# Patient Record
Sex: MALE | Race: OTHER | URBAN - METROPOLITAN AREA
[De-identification: names, ages, dates, MRNs, and addresses within clinical notes are randomized per-mention and may not be internally consistent; named-entity substitution may affect disease eponyms.]

---

## 2023-01-09 ENCOUNTER — OFFICE VISIT (OUTPATIENT)
Dept: ORTHOPEDIC SURGERY | Age: 48
End: 2023-01-09

## 2023-01-09 VITALS — BODY MASS INDEX: 35.55 KG/M2 | HEIGHT: 69 IN | WEIGHT: 240 LBS

## 2023-01-09 DIAGNOSIS — S83.412A SPRAIN OF MEDIAL COLLATERAL LIGAMENT OF LEFT KNEE, INITIAL ENCOUNTER: ICD-10-CM

## 2023-01-09 DIAGNOSIS — M25.561 RIGHT KNEE PAIN, UNSPECIFIED CHRONICITY: Primary | ICD-10-CM

## 2023-01-09 PROBLEM — I25.10 CORONARY ATHEROSCLEROSIS: Status: ACTIVE | Noted: 2018-10-23

## 2023-01-09 PROBLEM — I51.89 LEFT VENTRICULAR DIASTOLIC DYSFUNCTION WITH PRESERVED SYSTOLIC FUNCTION: Status: ACTIVE | Noted: 2023-01-09

## 2023-01-09 PROBLEM — R20.0 RIGHT LEG NUMBNESS: Status: ACTIVE | Noted: 2018-10-23

## 2023-01-09 PROBLEM — I27.20 PULMONARY HYPERTENSION (HCC): Status: ACTIVE | Noted: 2023-01-09

## 2023-01-09 PROBLEM — E66.9 OBESITY (BMI 30-39.9): Status: ACTIVE | Noted: 2019-10-02

## 2023-01-09 PROBLEM — I10 ESSENTIAL HYPERTENSION: Status: ACTIVE | Noted: 2021-10-12

## 2023-01-09 PROBLEM — I25.2 HISTORY OF MYOCARDIAL INFARCTION: Status: ACTIVE | Noted: 2019-01-23

## 2023-01-09 PROBLEM — G93.2 IDIOPATHIC INTRACRANIAL HYPERTENSION: Status: ACTIVE | Noted: 2022-02-22

## 2023-01-09 PROBLEM — E78.5 HYPERLIPIDEMIA: Status: ACTIVE | Noted: 2021-10-12

## 2023-01-09 PROBLEM — K92.1 MELENA: Status: ACTIVE | Noted: 2018-10-23

## 2023-01-09 PROBLEM — G47.33 OSA (OBSTRUCTIVE SLEEP APNEA): Status: ACTIVE | Noted: 2019-10-16

## 2023-01-09 PROBLEM — R51.9 INTRACTABLE HEADACHE: Status: ACTIVE | Noted: 2018-10-23

## 2023-01-09 PROBLEM — U07.1 COVID-19: Status: ACTIVE | Noted: 2020-12-09

## 2023-01-09 PROBLEM — E11.65 TYPE 2 DIABETES MELLITUS WITH HYPERGLYCEMIA (HCC): Status: ACTIVE | Noted: 2018-10-23

## 2023-01-09 PROBLEM — R10.13 DYSPEPSIA: Status: ACTIVE | Noted: 2018-10-23

## 2023-01-09 RX ORDER — HYDROCODONE BITARTRATE AND ACETAMINOPHEN 5; 325 MG/1; MG/1
TABLET ORAL
COMMUNITY
Start: 2022-12-29

## 2023-01-09 RX ORDER — OSELTAMIVIR PHOSPHATE 75 MG/1
75 CAPSULE ORAL 2 TIMES DAILY
COMMUNITY
Start: 2022-10-28

## 2023-01-09 RX ORDER — LISINOPRIL 10 MG/1
TABLET ORAL
COMMUNITY

## 2023-01-09 RX ORDER — ATORVASTATIN CALCIUM 20 MG/1
TABLET, FILM COATED ORAL
COMMUNITY

## 2023-01-09 RX ORDER — EMPAGLIFLOZIN 25 MG/1
TABLET, FILM COATED ORAL
COMMUNITY
Start: 2023-01-03

## 2023-01-09 RX ORDER — BENZONATATE 100 MG/1
CAPSULE ORAL
COMMUNITY
Start: 2022-10-29

## 2023-01-09 RX ORDER — POLYETHYLENE GLYCOL 3350 17 G/17G
17 POWDER, FOR SOLUTION ORAL DAILY PRN
COMMUNITY
Start: 2022-02-25

## 2023-01-09 RX ORDER — TOPIRAMATE 50 MG/1
TABLET, FILM COATED ORAL
COMMUNITY
Start: 2022-10-21

## 2023-01-09 RX ORDER — NAPROXEN 500 MG/1
500 TABLET ORAL 2 TIMES DAILY WITH MEALS
COMMUNITY
Start: 2022-12-28

## 2023-01-09 RX ORDER — OSELTAMIVIR PHOSPHATE 75 MG/1
CAPSULE ORAL
COMMUNITY
Start: 2022-10-29

## 2023-01-09 RX ORDER — FAMOTIDINE 20 MG/1
TABLET, FILM COATED ORAL
COMMUNITY

## 2023-01-09 RX ORDER — TOPIRAMATE 50 MG/1
TABLET, FILM COATED ORAL
COMMUNITY
Start: 2022-07-14

## 2023-01-09 RX ORDER — GLIPIZIDE 5 MG/1
TABLET, FILM COATED, EXTENDED RELEASE ORAL
COMMUNITY

## 2023-01-09 RX ORDER — HYDROCODONE BITARTRATE AND ACETAMINOPHEN 5; 325 MG/1; MG/1
1 TABLET ORAL EVERY 6 HOURS PRN
COMMUNITY
Start: 2022-12-28

## 2023-01-09 RX ORDER — OMEPRAZOLE 20 MG/1
20 CAPSULE, DELAYED RELEASE ORAL DAILY
COMMUNITY

## 2023-01-09 RX ORDER — NAPROXEN 500 MG/1
TABLET ORAL
COMMUNITY
Start: 2022-12-29

## 2023-01-09 RX ORDER — GLIPIZIDE 10 MG/1
TABLET, FILM COATED, EXTENDED RELEASE ORAL
COMMUNITY

## 2023-01-09 RX ORDER — BENZONATATE 100 MG/1
100 CAPSULE ORAL EVERY 8 HOURS
COMMUNITY
Start: 2022-10-28

## 2023-01-09 RX ORDER — PANTOPRAZOLE SODIUM 20 MG/1
TABLET, DELAYED RELEASE ORAL
COMMUNITY

## 2023-01-09 RX ORDER — ACETAMINOPHEN 500 MG
1000 TABLET ORAL EVERY 6 HOURS PRN
COMMUNITY
Start: 2022-02-24

## 2023-01-09 RX ORDER — IBUPROFEN 200 MG
400 TABLET ORAL EVERY 6 HOURS PRN
COMMUNITY
Start: 2022-02-24

## 2023-01-09 RX ORDER — RIMEGEPANT SULFATE 75 MG/75MG
75 TABLET, ORALLY DISINTEGRATING ORAL
COMMUNITY
Start: 2022-07-14 | End: 2023-07-14

## 2023-01-09 NOTE — PROGRESS NOTES
Reddie Hinge brace for patients right knee. I explained how the hinge on each side of the brace should line up with the patella. The patient was also instructed to tighten the strap distal to the patella first to insure the brace is anchored and prevents slipping, , then the top strap should be tightened. Patient read and signed documenting they understand and agree to [facility]'s current DME return policy.

## 2023-01-09 NOTE — LETTER
[address] [facility]  [address] [place] [address],[facility] [address]  Phone: [phone]  Fax: [phone], [address]        January 9, 2023     Patient: Trevor Billings   YOB: 1975   Date of Visit: 1/9/2023       To Whom It May Concern: It is my medical opinion that Trevor Billings may return to work but should limit the amount of standing, walking long distances, and stairs as much as possible until his next follow-up. If you have any questions or concerns, please don't hesitate to call.     Sincerely,        Dilma Cooper, [address]
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
## 2023-01-09 NOTE — LETTER
DME Patient Authorization Form    Name: Jacinto Rizo  : 1975  MRN: [id number]   Age: 49 y.o. Gender: male  Delivery Address: [facility]     Diagnosis:     ICD-10-CM    1. Right knee pain, unspecified chronicity  M25.561 Ambulatory referral to Physical Therapy      2. Sprain of medial collateral ligament of right knee, initial encounter  S83.412A Ambulatory referral to Physical Therapy           Requested DME:  Reddie Hinged Brace- ($170.00) X 1 - right        Clinical Notes:     **Indicates non-covered items by insurance. Payment expected on date of service. Electronically signed by  Provider: AI Osei__Date: 2023                            [address] Tax ID # [id number]        Durable Medical Equipment and/or Orthotics Patient Consent     I understand that my physician has prescribed this medical supply as part of my treatment plan as a matter of Medical Necessity.  I understand that I have a choice in where I receive my prescribed orthopedic supplies and/or services.  I authorize [facility] to furnish this service/product and to provide my insurance carrier with any information requested in order to process for payment.  I instruct my insurance carrier to pay [facility] directly for these services/products.  I understand that my insurance carrier may deny payment for this supply because it is a non-covered item, deemed not medically necessary or considered experimental.   I understand that any cost not covered by my insurance carrier will be solely my financial responsibility.  I have received the Supplier Standards and have reviewed them.    I have received the prescribed item and have been fully instructed on the proper use of the above services/products.    ______ (Patient Initials) I understand that all DME items are non-returnable after being dispensed. Items still in sealed packaging may be returned up to 14 days after purchasing. [address] will replace items that are defective.    ______ (Patient Initials) I understand that Jasmin Delgado will not file a claim with my insurance carrier for this service/product and I am waiving my right to file a claim on my own for this service/product with my insurance company as this item is NON-COVERED (Denoted by the **) by my Insurance company/policy. ______ (Patient Initials) I understand that I am responsible to bring my equipment to the hospital for any surgery. ______________________________________________  ________________________  Patient / Valdez Mace            Thank you for considering [address] Your physician has prescribed specific medical equipment or devices for your home use. The following describes your rights and responsibilities as our customer. Right to Choose Providers: You have a choice regarding which company supplies your home medical equipment and devices, and to consult your physician in this decision. You may choose a medical supply store, a home medical equipment provider, or a specialist such as [facility]. [facility] will coordinate with your physician to provide the medical equipment or devices prescribed for your home use. Right to Service:  You have the right to considerate, respectful and nondiscriminatory care. You have the right to receive accurate and easily understood information about your health care. If you speak a foreign language, or don't understand the discussions, assistance will be provided to allow you to make informed health care decisions. You have the right to know your treatment options and to participate in decisions about your care, including the right to accept or refuse treatment.   You have the right to expect a reasonable response to your requests for treatment or service. You have the right to talk in confidence with health care providers and to have your health care information protected. You have the right to receive an explanation of your bill. You have the right to complain about the service or product you receive. Patient Responsibilities:  Please provide complete and accurate information about your health insurance benefits and make arrangements for the timely payment of your bill. [facility] will, if possible, assume responsibility for billing your insurance (Medicare, Medicaid and commercial) for the prescribed equipment or devices. If your policy does not cover the prescribed product, or only covers a portion of the bill, you are responsible for any remaining balance. Return and Exchange Policy:  [facility] will honor published  Warranties for products. [facility] will accept returns or exchanges within 14 days from the date of receipt, providin) the product must be in new condition; 2) receipt as required; and 3) used disposable and hygiene products may only be returned due to a defective product. Note: Refunds will be issued in a timely manner, please allow 4-6 weeks for processing. Complaint Procedures and DME Consumer Protection Resources:  [facility] values you as a customer, and is committed to resolving patient concerns. This commitment includes understanding and documenting your concerns, conducting a review of internal procedures, and providing you with an explanation and resolution to your concerns. Should you have any questions about our services or billing process, please contact our office at (practice phone number). If we are unable to resolve the concern, you have the right to direct comments to the office of Consumer Protection, in the [address] or the [facility] office, without fear of repercussion.     Consuelo Ritchie STANDARDS    A supplier must be in compliance with all applicable Federal and State licensure and regulatory requirements. A supplier must provide complete and accurate information on the DMEPOS supplier application. Any changes to this information must be reported to the [facility] within 30 days. An authorized individual (one whose signature is binding) must sign the application for billing privileges. A supplier must fill orders from its own inventory, or must contract with other companies for the purchase of items necessary to fill the order. A supplier may not contract with any entity that is currently excluded from the Medicare program, any [facility] program, or from any other Federal procurement or Nonprocurement programs. A supplier must advise beneficiaries that they may rent or purchase inexpensive or routinely purchased durable medical equipment, and of the purchase option for capped rental equipment. A supplier must notify beneficiaries of warranty coverage and honor all warranties under applicable State Law, and repair or replace free of charge Medicare covered items that are under warranty. A supplier must maintain a physical facility on an appropriate site. A supplier must permit CMS, or its agents to conduct on-site inspections to ascertain the supplier's compliance with these standards. The supplier location must be accessible to beneficiaries during reasonable business hours, and must maintain a visible sign and posted hours of operation. A supplier must maintain a primary business telephone listed under the name of the business in a [employer] or a toll free number available through directory assistance. The exclusive use of a beeper, answering machine or cell phone is prohibited.   A supplier must have comprehensive liability insurance in the amount of at least $300,000 that covers both the supplier's place of business and all customers and employees of the supplier. If the supplier manufactures its own items, this insurance must also cover product liability and completed operations. A supplier must agree not to initiate telephone contact with beneficiaries, with a few exceptions allowed. This standard prohibits suppliers from calling beneficiaries in order to solicit new business. A supplier is responsible for delivery and must instruct beneficiaries on use of Medicare covered items, and maintain proof of delivery. A supplier must answer questions, and respond to complaints of the beneficiaries, and maintain documentation of such contacts. A supplier must maintain and replace at no charge or repair directly, or through a service contract with another company, Medicare covered items it has rented to beneficiaries. A supplier must accept returns of substandard (less than full quality for the particular item) or unsuitable items (inappropriate for the beneficiary at the time it was fitted and rented or sold) from beneficiaries. A supplier must disclose these supplier standards to each beneficiary to whom it supplies a Medicare-covered item. A supplier must disclose to the government any person having ownership, financial, or control interest in the supplier. A supplier must not convey or reassign a supplier number; i.e., the supplier may not sell or allow another entity to use its Medicare billing number. A supplier must have a complaint resolution protocol established to address beneficiary complaints that relate to these standards. A record of these complaints must be maintained at the physical facility. Complaint records must include: the name, address, telephone number and health insurance claim number of the beneficiary, a summary of the complaint, and any action taken to resolve it. A supplier must agree to furnish CMS any information required by the Medicare statute and implementing regulations.   A supplier of DMEPOS and other items and services must be accredited by a CMS-approved accreditation organization in order to receive and retain a supplier billing number. The accreditation must indicate the specific products and services, for which the supplier is accredited in order for the supplier to receive payment for those specific products and services. A DMEPOS supplier must notify their accreditation organization when a new DMEPOS location is opened. The accreditation organization may accredit the new supplier location for three months after it is operational without requiring a new site visit. All DMEPOS supplier locations, whether owned or subcontracted, must meet the [employer] and be separately accredited in order to bill Medicare. An accredited supplier may be denied enrollment or their enrollment may be revoked, if CMS determines that they are not in compliance with the DMEPOS quality standards. A DMEPOS supplier must disclose upon enrollment all products and services, including the addition of new product lines for which they are seeking accreditation. If a new product line is added after enrollment, the DMEPOS supplier will be responsible for notifying the accrediting body of the new product so that the DMEPOS supplier can be re-surveyed and accredited for these new products. Must meet the surety bond requirements specified in 42 C. F.R. 424.57(c). Implementation date- May 4, 2009. A supplier must obtain oxygen from a state-licensed oxygen supplier. A supplier must maintain ordering and referring documentation consistent with provisions found in 42 C. F.R. 424.516(f). DMEPOS suppliers are prohibited from sharing a practice location with certain other Medicare providers and suppliers. DMEPOS suppliers must remain open to the public for a minimum of 30 hours per week with certain exceptions.

## 2023-01-13 ENCOUNTER — HOSPITAL ENCOUNTER (OUTPATIENT)
Dept: PHYSICAL THERAPY | Age: 48
Setting detail: RECURRING SERIES
Discharge: HOME | End: 2023-01-16
Payer: COMMERCIAL

## 2023-01-13 PROCEDURE — 97110 THERAPEUTIC EXERCISES: CPT

## 2023-01-13 PROCEDURE — 97161 PT EVAL LOW COMPLEX 20 MIN: CPT

## 2023-01-13 ASSESSMENT — PAIN SCALES - GENERAL: PAINLEVEL_OUTOF10: 8

## 2023-01-13 ASSESSMENT — PAIN DESCRIPTION - LOCATION: LOCATION: KNEE

## 2023-01-13 ASSESSMENT — PAIN DESCRIPTION - ORIENTATION: ORIENTATION: RIGHT

## 2023-01-13 NOTE — PROGRESS NOTES
Jessica Parrish  : 1975  Primary: Kaya Peck2 (Xena WATTS)  Secondary:  [address] [address]  Phone: [phone]  Fax: [phone] Plan Frequency: 2x per week  Plan of Care/Certification Expiration Date: 23    PT Visit Info: Total # of Visits Approved: 60  Total # of Visits to Date: 1  Progress Note Counter: 1   Visit Count:  1   OUTPATIENT PHYSICAL THERAPY:OP NOTE TYPE: Treatment Note 2023       Episode  }Appt Desk             Treatment Diagnosis:   Pain in Right Knee (M25.561)  Difficulty in walking, Not elsewhere classified (R26.2)  Other abnormalities of gait and mobility (R26.89)  Medical/Referring Diagnosis:   Right knee pain, unspecified chronicity [M25.561]  Sprain of medial collateral ligament of left knee, initial encounter [N26.752O]  Referring Physician: AI Ventura MD Orders: PT Eval and Treat   Date of Onset: No data recorded   Allergies:  Adhesive tape  Restrictions/Precautions:  No data recorded  No data recorded   Interventions Planned (Treatment may consist of any combination of the following):    Current Treatment Recommendations: Strengthening; ROM; Balance training; Functional mobility training; Transfer training; ADL/Self-care training; Endurance training; Gait training; Stair training; Neuromuscular re-education; Manual; Pain management; Home exercise program; Return to work related activity; Safety education & training; Patient/Caregiver education & training; Equipment evaluation, education, & procurement; Positioning; Modalities; Dry needling; Therapeutic activities     Subjective Comments: See eval note from today.      Initial:}Right Knee 8/10     Post Session:  Right  Knee 8/10    Medications Last Reviewed: 2023    Updated Objective Findings: N/A    Treatment     GAIT TRAINING: (0 minutes): Gait training to improve and/or restore physical functioning as related to mobility, strength, balance and coordination. Ambulated with minimal visual, verbal, and tactile cueing related to stance phase, stride length, push off and heel strike. THERAPEUTIC EXERCISE: (12 minutes): Exercises per grid below to improve mobility, strength, and balance. Required minimal visual, verbal and tactile cues to promote proper body alignment, posture and body mechanics. Progressed resistance, range and repetitions as indicated. THERAPEUTIC ACTIVITY: (0 minutes): Therapeutic activities per grid below to improve mobility, strength and balance. Required minimal visual, verbal and tactile cues to promote static and dynamic balance in sitting/standing, as well as coordination of bilateral extremities. NEUROMUSCULAR RE-EDUCATION: (0 minutes): Exercise/activities per grid below to improve balance, coordination, kinesthetic sense, posture and proprioception. Required minimal visual, verbal and tactile cues to promote static and dynamic balance in sitting/standing, as well as coordination of bilateral extremities. MANUAL THERAPY: (0 minutes): Joint mobilization, soft tissue mobilization and manipulation was utilized and necessary because of the patient's restricted joint motion, painful spasm, loss of articular motion and restricted motion of soft tissue.      MODALITIES: (0 minutes): Vasopneumatic compression at 34 degrees      Date: 1/13/23 Date:  Date:    Activity / Exercise      Patient assessment / education HEP review and education regarding use of modalities     Quad sets 1 x 10 x 5 sec R       Straight leg raises 1 x 7 R  Stopped due to pain in knee     LAQ 1 x 10 R                           HEP   Supine Quad Sets - 2 x daily - 7 x weekly - 1 sets - 10-15 reps - 5 hold  Active Straight Leg Raise with Quad Set - 2 x daily - 7 x weekly - 1 sets - 10-15 reps - 2 hold  Long Arc Quad - 2 x daily - 7 x weekly - 1 sets - 10 reps - 2 hold  Ice - 5 x daily - 7 x weekly - 15-20 minutes    Treatment/Session Summary:    Treatment Assessment: Patient tolerated therapy well today. He verbalized understanding of his HEP handout and education regarding use of modalities for pain and swelling management. Communication/Consultation: PT encouraged patient to perform HEP consistently. Equipment provided today: None. Recommendations/Intent for next treatment session: Next visit will focus on strengthening & mobility.     Total Treatment Billable Duration: 33 minutes with eval  Time In: 1430  Time Out: MISAEL Baldwin       Charge Capture  }Post Session Pain  PT Visit Info  [facility]  MD Guidelines  Scanned Media  Benefits  MyChart    Future Appointments   Date Time Provider Jasmin Forman   1/17/2023  4:00 PM Luis M Elias [facility]   1/20/2023  3:15 PM Rosita Davenport, MISAEL [facility]   1/23/2023  3:30 PM AI Lucero [facility]   1/24/2023  3:15 PM Rosita Davenport, PT [facility]   1/26/2023  3:15 PM Rosita Davenport, PT [facility]

## 2023-01-13 NOTE — THERAPY EVALUATION
Bradley Silva  : 1975  Primary: Kaya Peck[address] ([facility])  Secondary:  [address] @ [address] [address]  Phone: [phone]  Fax: [phone]    PT Visit Info:    Plan Frequency: 2x per week  Plan of Care/Certification Expiration Date: 23   Total # of Visits Approved: 60  Total # of Visits to Date: 1  Progress Note Counter: 1    Visit Count:  1                OUTPATIENT PHYSICAL THERAPY:             OP NOTE TYPE: Initial Assessment 2023               Episode  Appt Desk         Treatment Diagnosis:    Pain in Right Knee (M25.561)  Difficulty in walking, Not elsewhere classified (R26.2)  Other abnormalities of gait and mobility (R26.89)  Medical/Referring Diagnosis:    Right knee pain, unspecified chronicity [M25.561]  Sprain of medial collateral ligament of left knee, initial encounter [O72.923R]  Referring Physician: AI Frey MD Orders: PT Eval and Treat   Return MD Appt: TBD  Date of Onset:       Allergies:  Adhesive tape  Restrictions/Precautions:       Medications Last Reviewed:  2023     SUBJECTIVE   History of Injury/Illness (Reason for Referral):  Mr. Shan Jimenez presents to outpatient PT with complaints of R knee pain s/p fall on Dec. 7 when he hit his knee on concrete. He also twisted it at a second event and continues to have pain and swelling along his medial joint line. He has been taking pain medication to manage his symptoms. He reports severe pain with ADLs and mobility, especially after sitting or standing/walking for extended periods of time. He reports navigating stairs is problematic for him. He wears a knee brace and says that knee flexion is his primary restriction due to onset of pain. He works for [employer].      Patient Stated Goal(s): \"Get better overall and improve knee\"    Initial: Right Knee 8/10    Post Session: Right Knee 8/10    Past Medical History/Comorbidities:    Nguyen Contreras  has no past medical history on file. Mr. Nguyen Contreras  has no past surgical history on file. Social History/Living Environment:         Prior Level of Function/Work/Activity:   Prior level of function: Independent     Learning:   Does the patient/guardian have any barriers to learning?: Language  Will there be a co-learner?: No  What is the preferred language of the patient/guardian?: Greenlandic  Is an  required?: Yes  Is an  present?: Yes  How does the patient/guardian prefer to learn new concepts?: Demonstration     Fall Risk Scale: Ramos Total Score: 50  Ramos Fall Risk: High (45 and higher)      OBJECTIVE     NATURE OF CONDITION Date: 1/13/23 Date:    Highest level of pain 9/10    Lowest level of pain 4/10    Aggravating factors Activity    Alleviating factors Pain medication    Frequency of symptoms Constant    Description of symptoms Sharp, throbbing      PALPATION Date: 1/13/23 Date:    Location of tenderness R medial joint line of knee    Patellar mobility Limited on R side; painful       RANGE OF MOTION Date: 1/13/23 Date:      RIGHT LEFT RIGHT LEFT   Knee Extension WNL WNL     Knee Flexion 88 deg 135 deg       STRENGTH Date: 1/13/23 Date:      RIGHT LEFT RIGHT LEFT   Hip Flexion 4+/5 5/5     Knee Extension 4+/5; pain 5/5     Knee Flexion 4+/5; pain 5/5     Ankle Dorsiflexion 4+/5; pain 5/5       MOBILITY Date: 1/13/23 Date:    Bed mobility Independent    Transfers Increased time & effort    Gait Decreased carl, decreased step length B, decreased stance time R    Stairs Not tested at eval      SKIN INTEGRITY  Clean, dry, & intact; edema noted along R medial side of knee    ASSESSMENT   Initial Assessment: Mr. Nguyen Contreras presents to outpatient PT with complaints of R knee pain s/p fall in December 2022. He demonstrates decreased knee ROM, strength, balance, and activity tolerance secondary to his pain.  He could benefit from skilled PT services to address his deficits and maximize his independence. Problem List: (Impacting functional limitations): Body Structures, Functions, Activity Limitations Requiring Skilled Therapeutic Intervention: Decreased functional mobility ; Decreased ADL status; Decreased ROM; Decreased tolerance to work activity; Decreased body mechanics; Decreased strength; Decreased endurance; Decreased balance; Decreased high-level IADLs; Increased pain     Therapy Prognosis:   Therapy Prognosis: Good     Initial Assessment Complexity:   Decision Making: Low Complexity    PLAN   Effective Dates: 01/13/22 TO Plan of Care/Certification Expiration Date: 04/13/23   Frequency/Duration: Plan Frequency: 2x per week   Interventions Planned (Treatment may consist of any combination of the following):    Current Treatment Recommendations: Strengthening; ROM; Balance training; Functional mobility training; Transfer training; ADL/Self-care training; Endurance training; Gait training; Stair training; Neuromuscular re-education; Manual; Pain management; Home exercise program; Return to work related activity; Safety education & training; Patient/Caregiver education & training; Equipment evaluation, education, & procurement; Positioning; Modalities; Dry needling; Therapeutic activities     Goals:  Short-Term Goals: Time Frame: 4 weeks   Patient will demonstrate understanding of a home exercise program independently. Patient will demonstrate >110 degrees of R knee flexion to improve gait mechanics and functional mobility. Patient will increase his score on the Lower Extremity Functional Scale by 12 points from his initial score to demonstrate gains in function. Long-Term Goals: Time Frame: 8 weeks  Patient will demonstrate >130 degrees of R knee flexion to improve gait mechanics and functional mobility. Patient will increase his score on the Lower Extremity Functional Scale by 25 points from his initial score to demonstrate gains in function.   Patient will navigate up/down 12 stairs with reciprocal gait pattern using LRAD to demonstrate improved mobility.         Outcome Measure:   Tool Used: Lower Extremity Functional Scale (LEFS)  Score:  Initial: 30/80 (Date: 1/13/23) Most Recent: X/80 (Date: -- )   Interpretation of Score: 20 questions each scored on a 5 point scale with 0 representing \"extreme difficulty or unable to perform\" and 4 representing \"no difficulty\". The lower the score, the greater the functional disability. 80/80 represents no disability. Minimal detectable change is 9 points.    MEDICAL NECESSITY:  Skilled intervention continues to be required due to above deficits affecting participation in basic ADLs and overall functional tolerance.     REASON FOR SERVICES/ OTHER COMMENTS:  Patient continues to require skilled intervention due to impairments affecting functional mobility.    Regarding Facundo Mondragon's therapy, I certify that the treatment plan above will be carried out by a therapist or under their direction.    Thank you for this referral,    Evens Lyon, PT, DPT    Referring Physician Signature: Nathaniel Segundo PA _______________________________ Date _____________        Post Session Pain  Charge Capture  PT Visit Info MD Chary Caldera

## 2023-01-17 ENCOUNTER — HOSPITAL ENCOUNTER (OUTPATIENT)
Dept: PHYSICAL THERAPY | Age: 48
Setting detail: RECURRING SERIES
End: 2023-01-17
Payer: COMMERCIAL

## 2023-01-17 NOTE — PROGRESS NOTES
Jhon Wayne  : 1975  Primary: [address]  Secondary:  [address] @ [address] [address]  Phone: [phone]  Fax: [phone] Plan Frequency: 2x per week    Plan of Care/Certification Expiration Date: 23      PT Visit Info: Total # of Visits Approved: 60  Total # of Visits to Date: 1  Progress Note Counter: 1  Canceled Appointment: 1         OUTPATIENT PHYSICAL THERAPY 2023     Appt Desk   Episode   MyChart      Mr. Tracy Norton was a same-day cancellation for today's appointment due to not feeling well.     Cancellation Number: [id number] [facility],[address], PT    Future Appointments   Date Time Provider Jasmin Forman   2023  4:00 PM Ardyce Grams, [facility]   2023  3:15 PM Ardyce Grams, [facility]   2023  3:30 PM AI Gutierrez [facility]   2023  3:15 PM Ardyce Grams, PT [facility]   2023  3:15 PM Ardyce Grams, PT [facility]

## 2023-01-20 ENCOUNTER — HOSPITAL ENCOUNTER (OUTPATIENT)
Dept: PHYSICAL THERAPY | Age: 48
Setting detail: RECURRING SERIES
Discharge: HOME | End: 2023-01-23
Payer: COMMERCIAL

## 2023-01-20 PROCEDURE — 97110 THERAPEUTIC EXERCISES: CPT

## 2023-01-20 PROCEDURE — 97016 VASOPNEUMATIC DEVICE THERAPY: CPT

## 2023-01-20 ASSESSMENT — PAIN SCALES - GENERAL: PAINLEVEL_OUTOF10: 0

## 2023-01-20 NOTE — PROGRESS NOTES
Carmen Green  : 1975  Primary: Kaya Rivas ([facility])  Secondary:  [address] [address]  Phone: [phone]  Fax: [phone] Plan Frequency: 2x per week  Plan of Care/Certification Expiration Date: 23      PT Visit Info: Total # of Visits Approved: 60  Total # of Visits to Date: 2  Progress Note Counter: 2  Canceled Appointment: 1     Visit Count:  2   OUTPATIENT PHYSICAL THERAPY:OP NOTE TYPE: Treatment Note 2023       Episode  }Appt Desk             Treatment Diagnosis:   Pain in Right Knee (M25.561)  Difficulty in walking, Not elsewhere classified (R26.2)  Other abnormalities of gait and mobility (R26.89)  Medical/Referring Diagnosis:   Right knee pain, unspecified chronicity [M25.561]  Sprain of medial collateral ligament of left knee, initial encounter [A73.898U]  Referring Physician: AI Dhillon MD Orders: PT Eval and Treat   Date of Onset: No data recorded   Allergies:  Adhesive tape  Restrictions/Precautions:  No data recorded  No data recorded   Interventions Planned (Treatment may consist of any combination of the following):    Current Treatment Recommendations: Strengthening; ROM; Balance training; Functional mobility training; Transfer training; ADL/Self-care training; Endurance training; Gait training; Stair training; Neuromuscular re-education; Manual; Pain management; Home exercise program; Return to work related activity; Safety education & training; Patient/Caregiver education & training; Equipment evaluation, education, & procurement; Positioning; Modalities; Dry needling; Therapeutic activities     Subjective Comments: Patient reports he's been doing the exercises at home and they've been helping his pain.  He says he's not having any pain upon arrival.     Initial:}    0/10     Post Session:       0/10     Medications Last Reviewed: 2023    Updated Objective Findings: N/A    Treatment     GAIT TRAINING: (0 minutes): Gait training to improve and/or restore physical functioning as related to mobility, strength, balance and coordination. Ambulated with minimal visual, verbal, and tactile cueing related to stance phase, stride length, push off and heel strike. THERAPEUTIC EXERCISE: (31 minutes): Exercises per grid below to improve mobility, strength, and balance. Required minimal visual, verbal and tactile cues to promote proper body alignment, posture and body mechanics. Progressed resistance, range and repetitions as indicated. THERAPEUTIC ACTIVITY: (0 minutes): Therapeutic activities per grid below to improve mobility, strength and balance. Required minimal visual, verbal and tactile cues to promote static and dynamic balance in sitting/standing, as well as coordination of bilateral extremities. NEUROMUSCULAR RE-EDUCATION: (0 minutes): Exercise/activities per grid below to improve balance, coordination, kinesthetic sense, posture and proprioception. Required minimal visual, verbal and tactile cues to promote static and dynamic balance in sitting/standing, as well as coordination of bilateral extremities. MANUAL THERAPY: (0 minutes): Joint mobilization, soft tissue mobilization and manipulation was utilized and necessary because of the patient's restricted joint motion, painful spasm, loss of articular motion and restricted motion of soft tissue. MODALITIES: (10 minutes): Vasopneumatic compression at 34 degrees to elevated R knee at medium pressure.      Date: 1/13/23 Date: 1/20/23 Date:    Activity / Exercise      Patient assessment / education HEP review and education regarding use of modalities     Quad sets 1 x 10 x 5 sec R       Straight leg raises 1 x 7 R  Stopped due to pain in knee 1 x 10 R 0/10 pain  1 x 10 R 2# 8/10 pain    LAQ 1 x 10 R   1 x 10 R 2#  1 x 10 R 4#    NuStep  6 minutes  Level 1    Sidelying hip abduction  2 x 10 R  5/10 pain in knee Sidelying hip adduction    Unable to perform due to pain    Standing hamstring curls  2 x 10 R 3#  9/10 pain in knee    Standing heel raises  2 x 15 B  Toes elevated 2\" at  bars                              HEP   Supine Quad Sets - 2 x daily - 7 x weekly - 1 sets - 10-15 reps - 5 hold  Active Straight Leg Raise with Quad Set - 2 x daily - 7 x weekly - 1 sets - 10-15 reps - 2 hold  Long Arc Quad - 2 x daily - 7 x weekly - 1 sets - 10 reps - 2 hold  Ice - 5 x daily - 7 x weekly - 15-20 minutes    Treatment/Session Summary:    Treatment Assessment: Patient tolerated therapy well today. He reported increased pain with weighted SLR after the set was performed, but demonstrates improved performance on SLR and LAQ overall compared to his last visit. He denied reports of pain throughout most exercises, but said at the end of the first or second set that he could no longer continue due to increased \"burning\" pain. With each additional exercise, patient's reports of pain increased. PT ended session with vasopneumatic compression to aid with pain management. Communication/Consultation: PT encouraged patient to perform HEP consistently. Equipment provided today: None. Recommendations/Intent for next treatment session: Next visit will focus on strengthening & mobility.     Total Treatment Billable Duration: 31 minutes + 10 minutes GameReady  Time In: 4892  Time Out: 4862    Asad Guerrero PT       Charge Capture  }Post Session Pain  PT Visit [address] [facility] Portal  MD Guidelines  Scanned Media  Benefits  MyChart    Future Appointments   Date Time Provider Jasmin Forman   1/23/2023  3:30 PM eSrg Kam [facility]   1/24/2023  3:15 PM Asad Guerrero PT [facility]   1/26/2023  3:15 PM Asad Guerrero PT [facility]

## 2023-01-24 ENCOUNTER — HOSPITAL ENCOUNTER (OUTPATIENT)
Dept: PHYSICAL THERAPY | Age: 48
Setting detail: RECURRING SERIES
End: 2023-01-24
Payer: COMMERCIAL

## 2023-01-24 NOTE — PROGRESS NOTES
Gloria Schroeder  : 1975  Primary: [address]  Secondary:  [address] [address]  Phone: [phone]  Fax: [phone] Plan Frequency: 2x per week    Plan of Care/Certification Expiration Date: 23      PT Visit Info: Total # of Visits Approved: 60  Total # of Visits to Date: 2  Progress Note Counter: 2  Canceled Appointment: 2         OUTPATIENT PHYSICAL THERAPY 2023     Appt Desk   Episode   MyChart      Mr. Nikki Garrison was a same-day cancellation for today's appointment due to being sick.     Cancellation Number: [id number]     Sudhir Poe PT    Future Appointments   Date Time Provider Jasmin Forman   2023  3:15 PM Sudhir Poe PT [facility]

## 2023-01-26 ENCOUNTER — HOSPITAL ENCOUNTER (OUTPATIENT)
Dept: PHYSICAL THERAPY | Age: 48
Setting detail: RECURRING SERIES
Discharge: HOME | End: 2023-01-29
Payer: COMMERCIAL

## 2023-01-26 PROCEDURE — 97110 THERAPEUTIC EXERCISES: CPT

## 2023-01-26 ASSESSMENT — PAIN SCALES - GENERAL: PAINLEVEL_OUTOF10: 0

## 2023-01-26 ASSESSMENT — PAIN DESCRIPTION - LOCATION: LOCATION: KNEE

## 2023-01-26 NOTE — PROGRESS NOTES
Rj Domníguez  : 1975  Primary: [address] ([employer])  Secondary:  [address] [address]  Phone: [phone]  Fax: [phone] Plan Frequency: 2x per week  Plan of Care/Certification Expiration Date: 23      PT Visit Info: Total # of Visits Approved: 60  Total # of Visits to Date: 3  Progress Note Counter: 3  Canceled Appointment: 2     Visit Count:  3   OUTPATIENT PHYSICAL THERAPY:OP NOTE TYPE: Treatment Note 2023       Episode  }Appt Desk             Treatment Diagnosis:   Pain in Right Knee (M25.561)  Difficulty in walking, Not elsewhere classified (R26.2)  Other abnormalities of gait and mobility (R26.89)  Medical/Referring Diagnosis:   Right knee pain, unspecified chronicity [M25.561]  Sprain of medial collateral ligament of left knee, initial encounter [Y79.864U]  Referring Physician: AI Maynard MD Orders: PT Eval and Treat   Date of Onset: No data recorded   Allergies:  Adhesive tape  Restrictions/Precautions:  No data recorded  No data recorded   Interventions Planned (Treatment may consist of any combination of the following):    Current Treatment Recommendations: Strengthening; ROM; Balance training; Functional mobility training; Transfer training; ADL/Self-care training; Endurance training; Gait training; Stair training; Neuromuscular re-education; Manual; Pain management; Home exercise program; Return to work related activity; Safety education & training; Patient/Caregiver education & training; Equipment evaluation, education, & procurement; Positioning; Modalities; Dry needling;  Therapeutic activities     Subjective Comments: Patient reports his knee is feeling good and he's not having any pain upon arrival.     Initial:}  Knee 0/10     Post Session:     Knee 0/10     Medications Last Reviewed: 2023    Updated Objective Findings: N/A    Treatment     GAIT TRAINING: (0 minutes): Gait training to improve and/or restore physical functioning as related to mobility, strength, balance and coordination. Ambulated with minimal visual, verbal, and tactile cueing related to stance phase, stride length, push off and heel strike. THERAPEUTIC EXERCISE: (38 minutes): Exercises per grid below to improve mobility, strength, and balance. Required minimal visual, verbal and tactile cues to promote proper body alignment, posture and body mechanics. Progressed resistance, range and repetitions as indicated. THERAPEUTIC ACTIVITY: (0 minutes): Therapeutic activities per grid below to improve mobility, strength and balance. Required minimal visual, verbal and tactile cues to promote static and dynamic balance in sitting/standing, as well as coordination of bilateral extremities. NEUROMUSCULAR RE-EDUCATION: (0 minutes): Exercise/activities per grid below to improve balance, coordination, kinesthetic sense, posture and proprioception. Required minimal visual, verbal and tactile cues to promote static and dynamic balance in sitting/standing, as well as coordination of bilateral extremities. MANUAL THERAPY: (0 minutes): Joint mobilization, soft tissue mobilization and manipulation was utilized and necessary because of the patient's restricted joint motion, painful spasm, loss of articular motion and restricted motion of soft tissue. MODALITIES: (0 minutes): Vasopneumatic compression at 34 degrees to elevated R knee at medium pressure.      Date: 1/13/23 Date: 1/20/23 Date: 1/26/23   Activity / Exercise      Patient assessment / education HEP review and education regarding use of modalities  DC assessment   Quad sets 1 x 10 x 5 sec R       Straight leg raises 1 x 7 R  Stopped due to pain in knee 1 x 10 R 0/10 pain  1 x 10 R 2# 8/10 pain 1 x 10 R 3# 0/10 pain    LAQ 1 x 10 R   1 x 10 R 2#  1 x 10 R 4# 1 x 10 R 3#  1 x 10 R 5#   NuStep  6 minutes  Level 1 5 minutes  Level 4 SF   Sidelying hip abduction  2 x 10 R  5/10 pain in knee    Sidelying hip adduction    Unable to perform due to pain    Standing hamstring curls  2 x 10 R 3#  9/10 pain in knee    Standing heel raises  2 x 15 B  Toes elevated 2\" at  bars    Goblet squats   2 x 10 with 25# KB     Forward step ups   2 x 10 R with 6\" step  No UE support                 HEP   Supine Quad Sets - 2 x daily - 7 x weekly - 1 sets - 10-15 reps - 5 hold  Active Straight Leg Raise with Quad Set - 2 x daily - 7 x weekly - 1 sets - 10-15 reps - 2 hold  Long Arc Quad - 2 x daily - 7 x weekly - 1 sets - 10 reps - 2 hold  Ice - 5 x daily - 7 x weekly - 15-20 minutes    Treatment/Session Summary:    Treatment Assessment: Patient tolerated therapy well today. He denied reports of pain with activities today. At the end of the visit, he said he thought he could make today his last visit due to his improvement. PT encouraged patient to continue with exercises / ice at home as needed. Communication/Consultation: PT encouraged patient to perform HEP consistently. Equipment provided today: None. Recommendations/Intent for next treatment session: Next visit will focus on strengthening & mobility. Total Treatment Billable Duration: 38 minutes  Time In: 0533  Time Out: 1425 [facility], PT       Charge Capture  }Post Session Pain  PT Visit Info  [employer] Portal  MD Guidelines  Scanned Media  Benefits  MyChart    No future appointments.

## 2023-01-27 NOTE — PROGRESS NOTES
Lety Rebollar  : 1975  Primary: [address]  Secondary:  Desiree Khan @ [address] [address]  Phone: [phone]  Fax: [phone] Plan Frequency: 2x per week    Plan of Care/Certification Expiration Date: 23      PT Visit Info: Total # of Visits Approved: 60  Total # of Visits to Date: 3  Progress Note Counter: 3  Canceled Appointment: 2         OUTPATIENT PHYSICAL THERAPY 2023     Appt Desk   Episode   MyChart      DISCONTINUATION SUMMARY: Mr. Bert Hernandez participated in 3 physical therapy visit(s). Treatment has been discontinued at this time due to  patient reporting he no longer requires PT services . The goals established at the start of care were not met due to inability to reassess secondary to lack of participation. The current episode of care will be closed at this time.  Thank you for this referral.     Kajal Virgen, PT